# Patient Record
Sex: MALE | Race: WHITE | URBAN - METROPOLITAN AREA
[De-identification: names, ages, dates, MRNs, and addresses within clinical notes are randomized per-mention and may not be internally consistent; named-entity substitution may affect disease eponyms.]

---

## 2021-02-16 ENCOUNTER — NEW PATIENT (OUTPATIENT)
Dept: URBAN - METROPOLITAN AREA CLINIC 30 | Facility: CLINIC | Age: 76
End: 2021-02-16
Payer: MEDICARE

## 2021-02-16 PROCEDURE — 92134 CPTRZ OPH DX IMG PST SGM RTA: CPT | Performed by: OPTOMETRIST

## 2021-02-16 PROCEDURE — 92004 COMPRE OPH EXAM NEW PT 1/>: CPT | Performed by: OPTOMETRIST

## 2021-02-16 ASSESSMENT — INTRAOCULAR PRESSURE
OS: 15
OD: 17

## 2021-02-16 ASSESSMENT — VISUAL ACUITY
OS: 20/30
OD: 20/25

## 2021-02-16 ASSESSMENT — KERATOMETRY
OS: 42.90
OD: 42.81

## 2022-02-15 ENCOUNTER — OFFICE VISIT (OUTPATIENT)
Dept: URBAN - METROPOLITAN AREA CLINIC 30 | Facility: CLINIC | Age: 77
End: 2022-02-15
Payer: MEDICARE

## 2022-02-15 DIAGNOSIS — H04.123 TEAR FILM INSUFFICIENCY OF BILATERAL LACRIMAL GLANDS: ICD-10-CM

## 2022-02-15 DIAGNOSIS — H40.013 OPEN ANGLE WITH BORDERLINE FINDINGS, LOW RISK, BILATERAL: ICD-10-CM

## 2022-02-15 DIAGNOSIS — H44.23 DEGENERATIVE MYOPIA, BILATERAL: ICD-10-CM

## 2022-02-15 DIAGNOSIS — H43.393 OTHER VITREOUS OPACITIES, BILATERAL: Primary | ICD-10-CM

## 2022-02-15 PROCEDURE — 92134 CPTRZ OPH DX IMG PST SGM RTA: CPT | Performed by: OPTOMETRIST

## 2022-02-15 PROCEDURE — 92014 COMPRE OPH EXAM EST PT 1/>: CPT | Performed by: OPTOMETRIST

## 2022-02-15 ASSESSMENT — INTRAOCULAR PRESSURE
OS: 15
OD: 16

## 2022-02-15 ASSESSMENT — VISUAL ACUITY
OD: 20/30
OS: 20/30

## 2022-02-15 ASSESSMENT — KERATOMETRY
OD: 43.10
OS: 42.89

## 2022-02-15 NOTE — IMPRESSION/PLAN
Impression: Open angle with borderline findings, low risk, bilateral: H40.013. Plan: IOP stable. Cont to monitor without tx. Rim tissue appears intact. CDR asymmetry but very myopic discs with PPA. CDR revised- difficult assessment. RTC for baseline testing. RTC 4 months VF 24-2, RNFL, PACHS, Gonio and IOP check.

## 2022-02-15 NOTE — IMPRESSION/PLAN
Impression: Tear film insufficiency of bilateral lacrimal glands Plan: Cont AT's qid OU. Recommend O3's. Avoid ceiling fans.

## 2022-02-15 NOTE — IMPRESSION/PLAN
Impression: Degenerative myopia, bilateral Plan: No evidence of RD or tear noted. Remains stable. See Mac OCT. All signs and risks of retinal detachment or tears were discussed in detail. If pt. notices any symptoms discussed, contact office ASAP. Recommend pt. return for normal recall.

## 2022-02-15 NOTE — IMPRESSION/PLAN
Impression: Other vitreous opacities, bilateral Plan:  Retina exam remains stable. Signs and symptoms of RD reviewed. RTC STAT if any increased in floaters or loss of VA. See mac oct for findings.

## 2022-06-13 ENCOUNTER — OFFICE VISIT (OUTPATIENT)
Dept: URBAN - METROPOLITAN AREA CLINIC 30 | Facility: CLINIC | Age: 77
End: 2022-06-13
Payer: MEDICARE

## 2022-06-13 DIAGNOSIS — H40.013 OPEN ANGLE WITH BORDERLINE FINDINGS, LOW RISK, BILATERAL: Primary | ICD-10-CM

## 2022-06-13 PROCEDURE — 99214 OFFICE O/P EST MOD 30 MIN: CPT | Performed by: OPTOMETRIST

## 2022-06-13 PROCEDURE — 76514 ECHO EXAM OF EYE THICKNESS: CPT | Performed by: OPTOMETRIST

## 2022-06-13 PROCEDURE — 92083 EXTENDED VISUAL FIELD XM: CPT | Performed by: OPTOMETRIST

## 2022-06-13 ASSESSMENT — INTRAOCULAR PRESSURE
OD: 18
OS: 17
OD: 17

## 2022-06-13 NOTE — IMPRESSION/PLAN
Impression: Open angle with borderline findings, low risk, bilateral: H40.013.
06/2022 PACHS 576, 579 TMAX 17,17 Plan: IOP stable OU. Rim tissue appears intact. CDR asymmetry but very myopic discs with PPA. CDR revised- difficult assessment. Cont to monitor without tx for now.  Repeat  x 3 months, if repeatable defects, consider tx. 

06/2022  OD) Inf NS OS) early inf NS, enlarged BS (PPA)

## 2022-09-16 ENCOUNTER — OFFICE VISIT (OUTPATIENT)
Dept: URBAN - METROPOLITAN AREA CLINIC 30 | Facility: CLINIC | Age: 77
End: 2022-09-16
Payer: MEDICARE

## 2022-09-16 DIAGNOSIS — H40.013 OPEN ANGLE WITH BORDERLINE FINDINGS, LOW RISK, BILATERAL: Primary | ICD-10-CM

## 2022-09-16 PROCEDURE — 99213 OFFICE O/P EST LOW 20 MIN: CPT | Performed by: OPTOMETRIST

## 2022-09-16 PROCEDURE — 92083 EXTENDED VISUAL FIELD XM: CPT | Performed by: OPTOMETRIST

## 2022-09-16 ASSESSMENT — INTRAOCULAR PRESSURE
OS: 18
OS: 17
OD: 18

## 2022-09-16 NOTE — IMPRESSION/PLAN
Impression: Open angle with borderline findings, low risk, bilateral: H40.013.
06/2022 PACHS 576, 579 TMAX 17,17 Plan: IOP remains stable OU. Rim tissue appears intact. CDR asymmetry but very myopic discs with PPA. CDR revised- difficult assessment. Cont to monitor without tx for now. Repeat  x 3 months, if repeatable defects, consider tx. 

06/2022  OD) Inf NS OS) early inf NS, enlarged BS (PPA)
09/2022  OD) Early inf arc defect but low reliability OS)  enlarged BS (PPA) low rel
09/2022 RNFL and GCA low reliability.

## 2023-02-14 ENCOUNTER — OFFICE VISIT (OUTPATIENT)
Dept: URBAN - METROPOLITAN AREA CLINIC 30 | Facility: CLINIC | Age: 78
End: 2023-02-14
Payer: MEDICARE

## 2023-02-14 DIAGNOSIS — H40.013 OPEN ANGLE WITH BORDERLINE FINDINGS, LOW RISK, BILATERAL: Primary | ICD-10-CM

## 2023-02-14 DIAGNOSIS — H44.23 DEGENERATIVE MYOPIA, BILATERAL: ICD-10-CM

## 2023-02-14 DIAGNOSIS — H43.393 OTHER VITREOUS OPACITIES, BILATERAL: ICD-10-CM

## 2023-02-14 DIAGNOSIS — H04.123 TEAR FILM INSUFFICIENCY OF BILATERAL LACRIMAL GLANDS: ICD-10-CM

## 2023-02-14 PROCEDURE — 92250 FUNDUS PHOTOGRAPHY W/I&R: CPT | Performed by: OPTOMETRIST

## 2023-02-14 PROCEDURE — 92134 CPTRZ OPH DX IMG PST SGM RTA: CPT | Performed by: OPTOMETRIST

## 2023-02-14 PROCEDURE — 99213 OFFICE O/P EST LOW 20 MIN: CPT | Performed by: OPTOMETRIST

## 2023-02-14 ASSESSMENT — INTRAOCULAR PRESSURE
OS: 16
OD: 17

## 2023-02-14 ASSESSMENT — KERATOMETRY
OD: 42.54
OS: 42.95

## 2023-02-14 ASSESSMENT — VISUAL ACUITY
OD: 20/30
OS: 20/30

## 2023-02-14 NOTE — IMPRESSION/PLAN
Impression: Other vitreous opacities, bilateral Plan: Retina exam remains stable. Signs and symptoms of RD reviewed. RTC STAT if any increased in floaters or loss of VA. See mac oct for today's findings.

## 2023-02-14 NOTE — IMPRESSION/PLAN
Impression: Tear film insufficiency of bilateral lacrimal glands 2/2023 OSMO OU) Below Plan: Cont use of AT's qid OU. O3's. Avoid ceiling fans.

## 2023-02-14 NOTE — IMPRESSION/PLAN
Impression: Open angle with borderline findings, low risk, bilateral: H40.013.
06/2022 PACHS 576, 579 TMAX 17,17 Plan: IOP remains stable OU. Rim tissue appears intact. CDR asymmetry but very myopic discs with PPA. CDR revised- difficult assessment. 2/2023 Disc Photos 06/2022  OD) Inf NS OS) early inf NS, enlarged BS (PPA)
09/2022  OD) Early inf arc defect but low reliability OS)  enlarged BS (PPA) low rel

09/2022 RNFL and GCA low reliability. PLAN:
1. Cont to monitor without tx for now. 2. If VF changes are repeatable, consider adjunct med to further lower IOP and reduce risk of LOV. 3. Monitor q6 months. RTC IN 4 MONTHS FOR IOP CHECK  c  + RNFL/GCA.

## 2023-02-14 NOTE — IMPRESSION/PLAN
Impression: Degenerative myopia, bilateral Plan: No evidence of RD or tear noted. Remains stable. See Mac OCT- stable. All signs and risks of retinal detachment or tears were discussed in detail. If pt. notices any symptoms discussed, contact office ASAP. Recommend pt. return for normal recall.